# Patient Record
Sex: FEMALE | Race: OTHER | URBAN - METROPOLITAN AREA
[De-identification: names, ages, dates, MRNs, and addresses within clinical notes are randomized per-mention and may not be internally consistent; named-entity substitution may affect disease eponyms.]

---

## 2024-09-12 ENCOUNTER — HOSPITAL ENCOUNTER (EMERGENCY)
Facility: HOSPITAL | Age: 49
Discharge: HOME | End: 2024-09-12
Attending: EMERGENCY MEDICINE

## 2024-09-12 VITALS
HEART RATE: 87 BPM | DIASTOLIC BLOOD PRESSURE: 81 MMHG | RESPIRATION RATE: 18 BRPM | OXYGEN SATURATION: 94 % | TEMPERATURE: 97.3 F | WEIGHT: 145.28 LBS | SYSTOLIC BLOOD PRESSURE: 122 MMHG

## 2024-09-12 DIAGNOSIS — T40.2X1A OPIOID OVERDOSE, ACCIDENTAL OR UNINTENTIONAL, INITIAL ENCOUNTER (HCC): Primary | ICD-10-CM

## 2024-09-12 PROCEDURE — 99284 EMERGENCY DEPT VISIT MOD MDM: CPT

## 2024-09-12 RX ORDER — ONDANSETRON 4 MG/1
4 TABLET, ORALLY DISINTEGRATING ORAL ONCE
Status: COMPLETED | OUTPATIENT
Start: 2024-09-12 | End: 2024-09-12

## 2024-09-12 RX ORDER — NALOXONE HYDROCHLORIDE 1 MG/ML
1 INJECTION PARENTERAL ONCE
Status: COMPLETED | OUTPATIENT
Start: 2024-09-12 | End: 2024-09-12

## 2024-09-12 RX ADMIN — ONDANSETRON 4 MG: 4 TABLET, ORALLY DISINTEGRATING ORAL at 01:37

## 2024-09-12 NOTE — ED PROVIDER NOTES
"1. Opioid overdose, accidental or unintentional, initial encounter (HCC)      ED Disposition       ED Disposition   Discharge    Condition   Stable    Date/Time   Thu Sep 12, 2024  5:39 AM    Comment   Carolina Taylor discharge to home/self care.                   Assessment & Plan         Medical Decision Making  Patient presented for suspected opioid use.  She received 2 mg of intranasal Narcan prior to arrival with improvement of her respirations, oxygen saturation, and level of alertness.  Differential diagnosis includes but is not limited to opioid use disorder, polysubstance use disorder, alcohol use disorder, or acute polysubstance intoxication; doubt psychiatric illness.  The patient does admit to heroin use tonight.  She does not want referrals to detox/rehab.  Throughout the patient's visit her oxygen saturation remained 95% and her respirations remained above 12.  Prior to discharge patient was alert and oriented with intact coordination and a steady gait.  Follow-up with PCP, but return to the ED in the interim with new or worsening symptoms.       Problems Addressed:  Opioid overdose, accidental or unintentional, initial encounter (HCC): acute illness or injury    Risk  Prescription drug management.              Medications   naloxone (FOR EMS ONLY) (NARCAN) 2 MG/2ML injection 2 mg (0 mg Does not apply Given to EMS 9/12/24 0143)   ondansetron (ZOFRAN-ODT) dispersible tablet 4 mg (4 mg Oral Given 9/12/24 0137)       History of Present Illness         The patient is a 49-year-old female with a past medical history of hypertension and diabetes mellitus, who presents after a suspected overdose.  Patient was found unconscious by [employer].  She had pinpoint pupils and an oxygen saturation in the 80s.  Patient was given 2mg of intranasal narcan with improvement in her level of consciousness and oxygen saturation.  On arrival patient is agitated and uncooperative with questioning.  She does admit to using \"a little " "bit\" of heroin and drinking beer tonight.  Denies any other illicit substance use.  Patient states that she is tired, cold, and nauseous at this time.  Denies chest pain, shortness of breath, lightheadedness, abdominal pain, vomiting, or fevers.      History provided by:  Patient and EMS personnel  History limited by: Patient cooperation.   used: Yes ([employer] )          Review of Systems   Constitutional:  Positive for chills and fatigue. Negative for fever.   HENT:  Negative for ear pain and sore throat.    Eyes:  Negative for pain and visual disturbance.   Respiratory:  Negative for cough and shortness of breath.    Cardiovascular:  Negative for chest pain and palpitations.   Gastrointestinal:  Positive for nausea. Negative for abdominal pain, constipation, diarrhea and vomiting.   Genitourinary:  Negative for dysuria and hematuria.   Musculoskeletal:  Negative for arthralgias, back pain and myalgias.   Skin:  Negative for color change and rash.   Neurological:  Negative for dizziness, seizures, syncope, light-headedness and headaches.   All other systems reviewed and are negative.        Objective     ED Triage Vitals [09/12/24 0128]   Temperature Pulse Blood Pressure Respirations SpO2 Patient Position - Orthostatic VS   (!) 97.3 °F (36.3 °C) 87 122/81 18 94 % Sitting      Temp Source Heart Rate Source BP Location FiO2 (%) Pain Score    Tympanic -- Left arm -- --        Physical Exam  Vitals and nursing note reviewed.   Constitutional:       General: She is awake. She is not in acute distress.     Appearance: She is normal weight. She is not toxic-appearing or diaphoretic.   HENT:      Head: Normocephalic and atraumatic.      Right Ear: External ear normal.      Left Ear: External ear normal.      Nose: Nose normal.      Mouth/Throat:      Lips: Pink.      Mouth: Mucous membranes are moist.      Pharynx: Oropharynx is clear. Uvula midline.   Eyes:      General: Lids are " normal. Vision grossly intact. Gaze aligned appropriately.      Conjunctiva/sclera: Conjunctivae normal.      Right eye: Right conjunctiva is not injected. No chemosis or hemorrhage.     Left eye: Left conjunctiva is not injected. No chemosis or hemorrhage.     Pupils: Pupils are equal, round, and reactive to light.      Comments: Pupils are 2 mm and equal bilaterally.   Cardiovascular:      Rate and Rhythm: Normal rate and regular rhythm.   Pulmonary:      Effort: Pulmonary effort is normal. No respiratory distress.   Abdominal:      General: Abdomen is flat.      Palpations: Abdomen is soft.      Tenderness: There is no abdominal tenderness. There is no guarding or rebound.   Musculoskeletal:      Cervical back: Normal, full passive range of motion without pain and neck supple.      Thoracic back: Normal.      Lumbar back: Normal.   Lymphadenopathy:      Cervical: No cervical adenopathy.   Skin:     General: Skin is warm.      Capillary Refill: Capillary refill takes less than 2 seconds.      Coloration: Skin is not pale.      Findings: No rash.   Neurological:      Mental Status: She is oriented to person, place, and time.      GCS: GCS eye subscore is 3. GCS verbal subscore is 5. GCS motor subscore is 6.      Comments: Patient is somnolent, but does answer questions when asked.  Moving all extremities without difficulty.   Psychiatric:         Attention and Perception: She is inattentive.         Mood and Affect: Mood normal. Affect is angry.         Speech: Speech normal.         Behavior: Behavior is uncooperative and agitated.         Labs Reviewed - No data to display  No orders to display       Procedures       Viviane Samuels PA-C  09/12/24 0557